# Patient Record
Sex: MALE | Race: BLACK OR AFRICAN AMERICAN | NOT HISPANIC OR LATINO | Employment: FULL TIME | URBAN - METROPOLITAN AREA
[De-identification: names, ages, dates, MRNs, and addresses within clinical notes are randomized per-mention and may not be internally consistent; named-entity substitution may affect disease eponyms.]

---

## 2017-07-03 ENCOUNTER — HOSPITAL ENCOUNTER (EMERGENCY)
Facility: OTHER | Age: 32
Discharge: HOME | End: 2017-07-03
Attending: EMERGENCY MEDICINE
Payer: COMMERCIAL

## 2017-07-03 VITALS
TEMPERATURE: 98 F | OXYGEN SATURATION: 99 % | SYSTOLIC BLOOD PRESSURE: 154 MMHG | BODY MASS INDEX: 22.4 KG/M2 | HEIGHT: 71 IN | HEART RATE: 50 BPM | DIASTOLIC BLOOD PRESSURE: 94 MMHG | WEIGHT: 160 LBS | RESPIRATION RATE: 16 BRPM

## 2017-07-03 DIAGNOSIS — K29.00 ACUTE GASTRITIS WITHOUT HEMORRHAGE, UNSPECIFIED GASTRITIS TYPE: ICD-10-CM

## 2017-07-03 DIAGNOSIS — R10.13 EPIGASTRIC ABDOMINAL PAIN: Primary | ICD-10-CM

## 2017-07-03 DIAGNOSIS — R10.9 ABDOMINAL PAIN: ICD-10-CM

## 2017-07-03 LAB
ALBUMIN SERPL BCP-MCNC: 4.5 G/DL
ALP SERPL-CCNC: 97 U/L
ALT SERPL W/O P-5'-P-CCNC: 19 U/L
ANION GAP SERPL CALC-SCNC: 12 MMOL/L
AST SERPL-CCNC: 29 U/L
BASOPHILS # BLD AUTO: 0.02 K/UL
BASOPHILS NFR BLD: 0.4 %
BILIRUB SERPL-MCNC: 0.2 MG/DL
BILIRUB UR QL STRIP: NEGATIVE
BUN SERPL-MCNC: 15 MG/DL
CALCIUM SERPL-MCNC: 10 MG/DL
CHLORIDE SERPL-SCNC: 105 MMOL/L
CLARITY UR: CLEAR
CO2 SERPL-SCNC: 22 MMOL/L
COLOR UR: YELLOW
CREAT SERPL-MCNC: 1 MG/DL
DIFFERENTIAL METHOD: ABNORMAL
EOSINOPHIL # BLD AUTO: 0.1 K/UL
EOSINOPHIL NFR BLD: 2 %
ERYTHROCYTE [DISTWIDTH] IN BLOOD BY AUTOMATED COUNT: 12.7 %
EST. GFR  (AFRICAN AMERICAN): >60 ML/MIN/1.73 M^2
EST. GFR  (NON AFRICAN AMERICAN): >60 ML/MIN/1.73 M^2
GLUCOSE SERPL-MCNC: 88 MG/DL
GLUCOSE UR QL STRIP: NEGATIVE
HCT VFR BLD AUTO: 41.2 %
HGB BLD-MCNC: 13.8 G/DL
HGB UR QL STRIP: NEGATIVE
KETONES UR QL STRIP: NEGATIVE
LEUKOCYTE ESTERASE UR QL STRIP: NEGATIVE
LIPASE SERPL-CCNC: 33 U/L
LYMPHOCYTES # BLD AUTO: 2 K/UL
LYMPHOCYTES NFR BLD: 38.8 %
MCH RBC QN AUTO: 29.3 PG
MCHC RBC AUTO-ENTMCNC: 33.5 %
MCV RBC AUTO: 88 FL
MONOCYTES # BLD AUTO: 0.2 K/UL
MONOCYTES NFR BLD: 4.5 %
NEUTROPHILS # BLD AUTO: 2.7 K/UL
NEUTROPHILS NFR BLD: 53.7 %
NITRITE UR QL STRIP: NEGATIVE
PH UR STRIP: 6 [PH] (ref 5–8)
PLATELET # BLD AUTO: 179 K/UL
PMV BLD AUTO: 12 FL
POTASSIUM SERPL-SCNC: 4.2 MMOL/L
PROT SERPL-MCNC: 8.5 G/DL
PROT UR QL STRIP: NEGATIVE
RBC # BLD AUTO: 4.71 M/UL
SODIUM SERPL-SCNC: 139 MMOL/L
SP GR UR STRIP: <=1.005 (ref 1–1.03)
URN SPEC COLLECT METH UR: ABNORMAL
UROBILINOGEN UR STRIP-ACNC: NEGATIVE EU/DL
WBC # BLD AUTO: 5.08 K/UL

## 2017-07-03 PROCEDURE — 99284 EMERGENCY DEPT VISIT MOD MDM: CPT | Mod: 25

## 2017-07-03 PROCEDURE — 81003 URINALYSIS AUTO W/O SCOPE: CPT

## 2017-07-03 PROCEDURE — 83690 ASSAY OF LIPASE: CPT

## 2017-07-03 PROCEDURE — 96360 HYDRATION IV INFUSION INIT: CPT

## 2017-07-03 PROCEDURE — 25000003 PHARM REV CODE 250: Performed by: EMERGENCY MEDICINE

## 2017-07-03 PROCEDURE — 80053 COMPREHEN METABOLIC PANEL: CPT

## 2017-07-03 PROCEDURE — 85025 COMPLETE CBC W/AUTO DIFF WBC: CPT

## 2017-07-03 RX ORDER — OMEPRAZOLE 20 MG/1
40 CAPSULE, DELAYED RELEASE ORAL DAILY
Qty: 30 CAPSULE | Refills: 0 | Status: SHIPPED | OUTPATIENT
Start: 2017-07-03 | End: 2019-06-11

## 2017-07-03 RX ADMIN — SODIUM CHLORIDE 1000 ML: 0.9 INJECTION, SOLUTION INTRAVENOUS at 10:07

## 2017-07-03 NOTE — ED TRIAGE NOTES
"Pt reports about 3-4 years ago having a "pancreas problems"; reports epigastric pain that started about 2-3 days ago; some intermittent diarrhea with some small tinged amount of blood noted in stool but denies any N/V;   "

## 2017-07-03 NOTE — ED PROVIDER NOTES
Encounter Date: 7/3/2017    SCRIBE #1 NOTE: I, Cynthia Galaviz, am scribing for, and in the presence of,  Dr. Cornell. I have scribed the entire note.       History     Chief Complaint   Patient presents with    Abdominal Pain     LUQ, hx of pancreatitis, admites to drinking last night     Time seen by provider: 9:28 AM    This is a 32 y.o. male who presents with complaint of upper abdominal pain. He reports onset of symptoms was a few hours ago. The patient states he woke with the pain. He describes the pain as sharp with a rating of 4/10. The patient denies any associated nausea, vomiting, diarrhea, urinary symptoms, fever or chills but admits to constipation and mild blood in stool. He states his stools have been hard for a few days. The patient also notes he has a hx of pancreatitis. He states he may be having a flare he drank more alcohol last night.       The history is provided by the patient.     Review of patient's allergies indicates:  No Known Allergies  Past Medical History:   Diagnosis Date    Pancreatitis      History reviewed. No pertinent surgical history.  History reviewed. No pertinent family history.  Social History   Substance Use Topics    Smoking status: Never Smoker    Smokeless tobacco: Never Used    Alcohol use Yes      Comment: occasionally     Review of Systems   Constitutional: Negative for chills and fever.   HENT: Negative for congestion and sore throat.    Eyes: Negative for redness and visual disturbance.   Respiratory: Negative for cough and shortness of breath.    Cardiovascular: Negative for chest pain and palpitations.   Gastrointestinal: Positive for abdominal pain. Negative for diarrhea, nausea and vomiting.   Genitourinary: Negative for dysuria.   Musculoskeletal: Negative for back pain.   Skin: Negative for rash.   Neurological: Negative for weakness and headaches.   Psychiatric/Behavioral: Negative for confusion.       Physical Exam     Initial Vitals [07/03/17 0840]    BP Pulse Resp Temp SpO2   116/67 63 16 97.5 °F (36.4 °C) 99 %      MAP       83.33         Physical Exam    Nursing note and vitals reviewed.  Constitutional: He appears well-developed and well-nourished. He is not diaphoretic. No distress.   HENT:   Head: Normocephalic and atraumatic.   Right Ear: External ear normal.   Left Ear: External ear normal.   Oropharynx is clear and intact. Moist mucus membranes   Eyes: EOM are normal.   Conjunctivae clear, pink, and intact.   Neck: Normal range of motion. Neck supple.   Cardiovascular: Normal rate and regular rhythm. Exam reveals no gallop and no friction rub.    No murmur heard.  Normal S1/S2.   Pulmonary/Chest: He has no wheezes. He has no rhonchi. He has no rales.   Clear to auscultation bilaterally.    Abdominal: Soft. Bowel sounds are normal. There is tenderness. There is no rebound and no guarding.   Mid epigastric tenderness.    Musculoskeletal: Normal range of motion. He exhibits no edema or tenderness.   No LE pitting edema.    Lymphadenopathy:     He has no cervical adenopathy.   Neurological: He is alert and oriented to person, place, and time. He has normal strength.   Skin: Skin is warm and dry. No rash noted.   No skin tenting. No lesions.         ED Course   Procedures  Labs Reviewed   URINALYSIS   CBC W/ AUTO DIFFERENTIAL   COMPREHENSIVE METABOLIC PANEL   LIPASE        Imaging Results          X-Ray Abdomen Flat And Erect (Final result)  Result time 07/03/17 09:52:33    Final result by Leandra Marcum MD (07/03/17 09:52:33)                 Impression:     No significant abnormality demonstrated.      Electronically signed by: Leandra Marcum MD  Date:     07/03/17  Time:    09:52              Narrative:    2 views obtained.  There is no free air.  The bowel gas pattern demonstrates no significant abnormality.  There are a few vascular calcifications within the left pelvis.  The osseous structures are intact.                              X-Rays:    Independently Interpreted Readings:   Abdomen: Flat and Erect Abdominal X-ray Reading (10:27 AM): No acute findings visualized. No infiltrate. No effusion.      Medical Decision Making:   Independently Interpreted Test(s):   I have ordered and independently interpreted X-rays - see prior notes.  Clinical Tests:   Lab Tests: Ordered and Reviewed  Radiological Study: Ordered and Reviewed            Scribe Attestation:   Scribe #1: I performed the above scribed service and the documentation accurately describes the services I performed. I attest to the accuracy of the note.    Attending Attestation:           Physician Attestation for Scribe:  Physician Attestation Statement for Scribe #1: I, Dr. Harris, reviewed documentation, as scribed by Cynthia Galaviz in my presence, and it is both accurate and complete.         Attending ED Notes:   Emergent evaluation a 32-year-old male with intermittent midepigastric abdominal pain after drinking alcohol over the past 3 days.  Patient is afebrile, nontoxic-appearing with stable vital signs.  No right upper quadrant tenderness to palpation.  No rebound or guarding.  Negative Kent sign.  No tenderness over the right lower quadrant the abdomen.  Patient has no elevation of white blood cell count.  H&H 13.8 and 41.2.  No acute findings on CMP except for total protein of 8.5.  Lipase is 33.  No acute findings and urinary analysis.  No acute findings on x-ray.  The patient is extensively counseled on his diagnosis and treatment including all diagnostic, laboratory and physical exam findings.  The patient is discharged in good condition and directed to follow-up with his primary care physician and gastroenterology in the next 24-48 hours.          ED Course     Clinical Impression:   No diagnosis found.                           Ever Harris MD  07/03/17 5161

## 2018-08-30 ENCOUNTER — OFFICE VISIT (OUTPATIENT)
Dept: URGENT CARE | Facility: CLINIC | Age: 33
End: 2018-08-30
Payer: COMMERCIAL

## 2018-08-30 VITALS
DIASTOLIC BLOOD PRESSURE: 77 MMHG | SYSTOLIC BLOOD PRESSURE: 140 MMHG | WEIGHT: 160 LBS | HEIGHT: 71 IN | OXYGEN SATURATION: 100 % | RESPIRATION RATE: 18 BRPM | BODY MASS INDEX: 22.4 KG/M2 | HEART RATE: 66 BPM | TEMPERATURE: 98 F

## 2018-08-30 DIAGNOSIS — J01.00 ACUTE NON-RECURRENT MAXILLARY SINUSITIS: Primary | ICD-10-CM

## 2018-08-30 DIAGNOSIS — J02.9 PHARYNGITIS, UNSPECIFIED ETIOLOGY: ICD-10-CM

## 2018-08-30 DIAGNOSIS — H65.93 OTITIS MEDIA WITH EFFUSION, BILATERAL: ICD-10-CM

## 2018-08-30 PROCEDURE — 99203 OFFICE O/P NEW LOW 30 MIN: CPT | Mod: 25,S$GLB,, | Performed by: PHYSICIAN ASSISTANT

## 2018-08-30 PROCEDURE — 96372 THER/PROPH/DIAG INJ SC/IM: CPT | Mod: S$GLB,,, | Performed by: PHYSICIAN ASSISTANT

## 2018-08-30 PROCEDURE — 3008F BODY MASS INDEX DOCD: CPT | Mod: CPTII,S$GLB,, | Performed by: PHYSICIAN ASSISTANT

## 2018-08-30 RX ORDER — AMOXICILLIN 875 MG/1
875 TABLET, FILM COATED ORAL 2 TIMES DAILY
Qty: 20 TABLET | Refills: 0 | Status: SHIPPED | OUTPATIENT
Start: 2018-08-30 | End: 2018-09-09

## 2018-08-30 RX ORDER — DEXAMETHASONE SODIUM PHOSPHATE 100 MG/10ML
6 INJECTION INTRAMUSCULAR; INTRAVENOUS ONCE
Status: COMPLETED | OUTPATIENT
Start: 2018-08-30 | End: 2018-08-30

## 2018-08-30 RX ADMIN — DEXAMETHASONE SODIUM PHOSPHATE 6 MG: 100 INJECTION INTRAMUSCULAR; INTRAVENOUS at 08:08

## 2018-08-30 NOTE — PATIENT INSTRUCTIONS
Sinusitis (Antibiotic Treatment)    The sinuses are air-filled spaces within the bones of the face. They connect to the inside of the nose. Sinusitis is an inflammation of the tissue lining the sinus cavity. Sinus inflammation can occur during a cold. It can also be due to allergies to pollens and other particles in the air. Sinusitis can cause symptoms of sinus congestion and fullness. A sinus infection causes fever, headache and facial pain. There is often green or yellow drainage from the nose or into the back of the throat (post-nasal drip). You have been given antibiotics to treat this condition.  Home care:  · Take the full course of antibiotics as instructed. Do not stop taking them, even if you feel better.  · Drink plenty of water, hot tea, and other liquids. This may help thin mucus. It also may promote sinus drainage.  · Heat may help soothe painful areas of the face. Use a towel soaked in hot water. Or,  the shower and direct the hot spray onto your face. Using a vaporizer along with a menthol rub at night may also help.   · An expectorant containing guaifenesin may help thin the mucus and promote drainage from the sinuses.  · Over-the-counter decongestants may be used unless a similar medicine was prescribed. Nasal sprays work the fastest. Use one that contains phenylephrine or oxymetazoline. First blow the nose gently. Then use the spray. Do not use these medicines more often than directed on the label or symptoms may get worse. You may also use tablets containing pseudoephedrine. Avoid products that combine ingredients, because side effects may be increased. Read labels. You can also ask the pharmacist for help. (NOTE: Persons with high blood pressure should not use decongestants. They can raise blood pressure.)  · Over-the-counter antihistamines may help if allergies contributed to your sinusitis.    · Do not use nasal rinses or irrigation during an acute sinus infection, unless told to by  your health care provider. Rinsing may spread the infection to other sinuses.  · Use acetaminophen or ibuprofen to control pain, unless another pain medicine was prescribed. (If you have chronic liver or kidney disease or ever had a stomach ulcer, talk with your doctor before using these medicines. Aspirin should never be used in anyone under 18 years of age who is ill with a fever. It may cause severe liver damage.)  · Don't smoke. This can worsen symptoms.  Follow-up care  Follow up with your healthcare provider or our staff if you are not improving within the next week.  When to seek medical advice  Call your healthcare provider if any of these occur:  · Facial pain or headache becoming more severe  · Stiff neck  · Unusual drowsiness or confusion  · Swelling of the forehead or eyelids  · Vision problems, including blurred or double vision  · Fever of 100.4ºF (38ºC) or higher, or as directed by your healthcare provider  · Seizure  · Breathing problems  · Symptoms not resolving within 10 days  Date Last Reviewed: 4/13/2015  © 7369-5446 [employer]. [address] [place] [address]. All rights reserved. This information is not intended as a substitute for professional medical care. Always follow your healthcare professional's instructions.      Please follow up with your Primary care provider within 2-5 days if your signs and symptoms have not resolved or worsen.     If your condition worsens or fails to improve we recommend that you receive another evaluation at the emergency room immediately or contact your primary medical clinic to discuss your concerns.   You must understand that you have received an Urgent Care treatment only and that you may be released before all of your medical problems are known or treated. You, the patient, will arrange for follow up care as instructed.     RED FLAGS/WARNING SYMPTOMS DISCUSSED WITH PATIENT THAT WOULD WARRANT EMERGENT MEDICAL ATTENTION. PATIENT  VERBALIZED UNDERSTANDING.

## 2018-08-30 NOTE — PROGRESS NOTES
"Subjective:       Patient ID: Ankur Dockery is a 33 y.o. male.    Vitals:  height is 5' 11" (1.803 m) and weight is 72.6 kg (160 lb). His temperature is 98.2 °F (36.8 °C). His blood pressure is 140/77 (abnormal) and his pulse is 66. His respiration is 18 and oxygen saturation is 100%.     Chief Complaint: No chief complaint on file.    Sinus Problem   This is a new problem. The current episode started 1 to 4 weeks ago. The problem has been gradually worsening since onset. There has been no fever. His pain is at a severity of 6/10. The pain is moderate. Associated symptoms include congestion, coughing, headaches, sinus pressure and a sore throat. Pertinent negatives include no chills, diaphoresis, ear pain, hoarse voice, neck pain, shortness of breath, sneezing or swollen glands. Past treatments include nothing. The treatment provided no relief.     Review of Systems   Constitution: Negative for chills, diaphoresis and fever.   HENT: Positive for congestion, sinus pressure and sore throat. Negative for ear pain, hoarse voice and sneezing.    Eyes: Negative for blurred vision.   Cardiovascular: Negative for chest pain.   Respiratory: Positive for cough. Negative for shortness of breath.    Skin: Negative for rash.   Musculoskeletal: Negative for back pain, joint pain and neck pain.   Gastrointestinal: Negative for abdominal pain, diarrhea, nausea and vomiting.   Neurological: Positive for headaches.   Psychiatric/Behavioral: The patient is not nervous/anxious.        Objective:      Physical Exam   Constitutional: He is oriented to person, place, and time. He appears well-developed and well-nourished. He is cooperative.  Non-toxic appearance. He does not appear ill. No distress.   HENT:   Head: Normocephalic and atraumatic.   Right Ear: Hearing, external ear and ear canal normal. A middle ear effusion is present.   Left Ear: Hearing, external ear and ear canal normal. A middle ear effusion is present.   Nose: Mucosal " edema, rhinorrhea and sinus tenderness present. No nasal deformity. No epistaxis. Right sinus exhibits maxillary sinus tenderness. Right sinus exhibits no frontal sinus tenderness. Left sinus exhibits maxillary sinus tenderness. Left sinus exhibits no frontal sinus tenderness.   Mouth/Throat: Uvula is midline and mucous membranes are normal. No trismus in the jaw. Normal dentition. No uvula swelling. Posterior oropharyngeal erythema present. Tonsils are 1+ on the right. Tonsils are 1+ on the left. No tonsillar exudate.   Eyes: Conjunctivae and lids are normal. No scleral icterus.   Sclera clear bilat   Neck: Trachea normal, full passive range of motion without pain and phonation normal. Neck supple.   Cardiovascular: Normal rate, regular rhythm, normal heart sounds, intact distal pulses and normal pulses.   Pulmonary/Chest: Effort normal and breath sounds normal. No respiratory distress.   Abdominal: Soft. Normal appearance and bowel sounds are normal. He exhibits no distension. There is no tenderness.   Musculoskeletal: Normal range of motion. He exhibits no edema or deformity.   Neurological: He is alert and oriented to person, place, and time. He exhibits normal muscle tone. Coordination normal.   Skin: Skin is warm, dry and intact. He is not diaphoretic. No pallor.   Psychiatric: He has a normal mood and affect. His speech is normal and behavior is normal. Judgment and thought content normal. Cognition and memory are normal.   Nursing note and vitals reviewed.      Assessment:       1. Acute non-recurrent maxillary sinusitis    2. Pharyngitis, unspecified etiology    3. Otitis media with effusion, bilateral        Plan:         Acute non-recurrent maxillary sinusitis  -     amoxicillin (AMOXIL) 875 MG tablet; Take 1 tablet (875 mg total) by mouth 2 (two) times daily. for 10 days  Dispense: 20 tablet; Refill: 0    Pharyngitis, unspecified etiology    Otitis media with effusion, bilateral    Other orders  -      dexamethasone injection 6 mg; Inject 0.6 mLs (6 mg total) into the muscle once.          Sinusitis (Antibiotic Treatment)    The sinuses are air-filled spaces within the bones of the face. They connect to the inside of the nose. Sinusitis is an inflammation of the tissue lining the sinus cavity. Sinus inflammation can occur during a cold. It can also be due to allergies to pollens and other particles in the air. Sinusitis can cause symptoms of sinus congestion and fullness. A sinus infection causes fever, headache and facial pain. There is often green or yellow drainage from the nose or into the back of the throat (post-nasal drip). You have been given antibiotics to treat this condition.  Home care:  · Take the full course of antibiotics as instructed. Do not stop taking them, even if you feel better.  · Drink plenty of water, hot tea, and other liquids. This may help thin mucus. It also may promote sinus drainage.  · Heat may help soothe painful areas of the face. Use a towel soaked in hot water. Or,  the shower and direct the hot spray onto your face. Using a vaporizer along with a menthol rub at night may also help.   · An expectorant containing guaifenesin may help thin the mucus and promote drainage from the sinuses.  · Over-the-counter decongestants may be used unless a similar medicine was prescribed. Nasal sprays work the fastest. Use one that contains phenylephrine or oxymetazoline. First blow the nose gently. Then use the spray. Do not use these medicines more often than directed on the label or symptoms may get worse. You may also use tablets containing pseudoephedrine. Avoid products that combine ingredients, because side effects may be increased. Read labels. You can also ask the pharmacist for help. (NOTE: Persons with high blood pressure should not use decongestants. They can raise blood pressure.)  · Over-the-counter antihistamines may help if allergies contributed to your sinusitis.    · Do  not use nasal rinses or irrigation during an acute sinus infection, unless told to by your health care provider. Rinsing may spread the infection to other sinuses.  · Use acetaminophen or ibuprofen to control pain, unless another pain medicine was prescribed. (If you have chronic liver or kidney disease or ever had a stomach ulcer, talk with your doctor before using these medicines. Aspirin should never be used in anyone under 18 years of age who is ill with a fever. It may cause severe liver damage.)  · Don't smoke. This can worsen symptoms.  Follow-up care  Follow up with your healthcare provider or our staff if you are not improving within the next week.  When to seek medical advice  Call your healthcare provider if any of these occur:  · Facial pain or headache becoming more severe  · Stiff neck  · Unusual drowsiness or confusion  · Swelling of the forehead or eyelids  · Vision problems, including blurred or double vision  · Fever of 100.4ºF (38ºC) or higher, or as directed by your healthcare provider  · Seizure  · Breathing problems  · Symptoms not resolving within 10 days  Date Last Reviewed: 4/13/2015  © 2719-0802 [employer]. [address] [place] [address]. All rights reserved. This information is not intended as a substitute for professional medical care. Always follow your healthcare professional's instructions.      Please follow up with your Primary care provider within 2-5 days if your signs and symptoms have not resolved or worsen.     If your condition worsens or fails to improve we recommend that you receive another evaluation at the emergency room immediately or contact your primary medical clinic to discuss your concerns.   You must understand that you have received an Urgent Care treatment only and that you may be released before all of your medical problems are known or treated. You, the patient, will arrange for follow up care as instructed.     RED FLAGS/WARNING  SYMPTOMS DISCUSSED WITH PATIENT THAT WOULD WARRANT EMERGENT MEDICAL ATTENTION. PATIENT VERBALIZED UNDERSTANDING.

## 2018-08-30 NOTE — LETTER
August 30, 2018      [facility] [address] [address]  Phone: [phone]  Fax: [phone]       Patient: Ankur Dockery   YOB: 1985  Date of Visit: 08/30/2018    To Whom It May Concern:    HARSHAD Dockery  was at [facility] on 08/30/2018. He may return to work/school on 8/31/18 with no restrictions. If you have any questions or concerns, or if I can be of further assistance, please do not hesitate to contact me.    Sincerely,    Carmen Pearce PA-C

## 2021-03-27 ENCOUNTER — IMMUNIZATION (OUTPATIENT)
Dept: PRIMARY CARE CLINIC | Facility: CLINIC | Age: 36
End: 2021-03-27
Payer: OTHER GOVERNMENT

## 2021-03-27 DIAGNOSIS — Z23 NEED FOR VACCINATION: Primary | ICD-10-CM

## 2021-03-27 PROCEDURE — 91300 COVID-19, MRNA, LNP-S, PF, 30 MCG/0.3 ML DOSE VACCINE: CPT | Mod: PBBFAC | Performed by: EMERGENCY MEDICINE

## 2021-04-17 ENCOUNTER — IMMUNIZATION (OUTPATIENT)
Dept: PRIMARY CARE CLINIC | Facility: CLINIC | Age: 36
End: 2021-04-17

## 2021-04-17 DIAGNOSIS — Z23 NEED FOR VACCINATION: Primary | ICD-10-CM

## 2021-04-17 PROCEDURE — 91300 COVID-19, MRNA, LNP-S, PF, 30 MCG/0.3 ML DOSE VACCINE: ICD-10-PCS | Mod: S$GLB,,, | Performed by: FAMILY MEDICINE

## 2021-04-17 PROCEDURE — 0002A COVID-19, MRNA, LNP-S, PF, 30 MCG/0.3 ML DOSE VACCINE: CPT | Mod: CV19,S$GLB,, | Performed by: FAMILY MEDICINE

## 2021-04-17 PROCEDURE — 91300 COVID-19, MRNA, LNP-S, PF, 30 MCG/0.3 ML DOSE VACCINE: CPT | Mod: S$GLB,,, | Performed by: FAMILY MEDICINE

## 2021-04-17 PROCEDURE — 0002A COVID-19, MRNA, LNP-S, PF, 30 MCG/0.3 ML DOSE VACCINE: ICD-10-PCS | Mod: CV19,S$GLB,, | Performed by: FAMILY MEDICINE

## 2021-07-01 ENCOUNTER — PATIENT MESSAGE (OUTPATIENT)
Dept: ADMINISTRATIVE | Facility: OTHER | Age: 36
End: 2021-07-01

## 2022-01-21 ENCOUNTER — IMMUNIZATION (OUTPATIENT)
Dept: PRIMARY CARE CLINIC | Facility: CLINIC | Age: 37
End: 2022-01-21
Payer: COMMERCIAL

## 2022-01-21 DIAGNOSIS — Z23 NEED FOR VACCINATION: Primary | ICD-10-CM

## 2022-01-21 PROCEDURE — 91300 COVID-19, MRNA, LNP-S, PF, 30 MCG/0.3 ML DOSE VACCINE: CPT | Mod: PBBFAC,PN

## 2022-01-21 PROCEDURE — 0001A COVID-19, MRNA, LNP-S, PF, 30 MCG/0.3 ML DOSE VACCINE: CPT | Mod: PBBFAC,PN

## 2022-02-25 ENCOUNTER — OFFICE VISIT (OUTPATIENT)
Dept: INTERNAL MEDICINE | Facility: CLINIC | Age: 37
End: 2022-02-25
Payer: COMMERCIAL

## 2022-02-25 VITALS
HEART RATE: 86 BPM | HEIGHT: 68 IN | WEIGHT: 158.94 LBS | BODY MASS INDEX: 24.09 KG/M2 | OXYGEN SATURATION: 98 % | SYSTOLIC BLOOD PRESSURE: 110 MMHG | DIASTOLIC BLOOD PRESSURE: 72 MMHG

## 2022-02-25 DIAGNOSIS — Z87.19 HISTORY OF ACUTE PANCREATITIS: ICD-10-CM

## 2022-02-25 DIAGNOSIS — Z00.00 LABORATORY EXAMINATION ORDERED AS PART OF A ROUTINE GENERAL MEDICAL EXAMINATION: ICD-10-CM

## 2022-02-25 DIAGNOSIS — F10.20 UNCOMPLICATED ALCOHOL DEPENDENCE: ICD-10-CM

## 2022-02-25 DIAGNOSIS — Z76.89 ESTABLISHING CARE WITH NEW DOCTOR, ENCOUNTER FOR: Primary | ICD-10-CM

## 2022-02-25 DIAGNOSIS — Z00.00 ANNUAL PHYSICAL EXAM: ICD-10-CM

## 2022-02-25 PROCEDURE — 99999 PR PBB SHADOW E&M-EST. PATIENT-LVL IV: ICD-10-PCS | Mod: PBBFAC,,, | Performed by: INTERNAL MEDICINE

## 2022-02-25 PROCEDURE — 99203 PR OFFICE/OUTPT VISIT, NEW, LEVL III, 30-44 MIN: ICD-10-PCS | Mod: S$GLB,,, | Performed by: INTERNAL MEDICINE

## 2022-02-25 PROCEDURE — 99203 OFFICE O/P NEW LOW 30 MIN: CPT | Mod: S$GLB,,, | Performed by: INTERNAL MEDICINE

## 2022-02-25 PROCEDURE — 1159F PR MEDICATION LIST DOCUMENTED IN MEDICAL RECORD: ICD-10-PCS | Mod: CPTII,S$GLB,, | Performed by: INTERNAL MEDICINE

## 2022-02-25 PROCEDURE — 3074F SYST BP LT 130 MM HG: CPT | Mod: CPTII,S$GLB,, | Performed by: INTERNAL MEDICINE

## 2022-02-25 PROCEDURE — 1160F PR REVIEW ALL MEDS BY PRESCRIBER/CLIN PHARMACIST DOCUMENTED: ICD-10-PCS | Mod: CPTII,S$GLB,, | Performed by: INTERNAL MEDICINE

## 2022-02-25 PROCEDURE — 1160F RVW MEDS BY RX/DR IN RCRD: CPT | Mod: CPTII,S$GLB,, | Performed by: INTERNAL MEDICINE

## 2022-02-25 PROCEDURE — 1159F MED LIST DOCD IN RCRD: CPT | Mod: CPTII,S$GLB,, | Performed by: INTERNAL MEDICINE

## 2022-02-25 PROCEDURE — 3074F PR MOST RECENT SYSTOLIC BLOOD PRESSURE < 130 MM HG: ICD-10-PCS | Mod: CPTII,S$GLB,, | Performed by: INTERNAL MEDICINE

## 2022-02-25 PROCEDURE — 3044F PR MOST RECENT HEMOGLOBIN A1C LEVEL <7.0%: ICD-10-PCS | Mod: CPTII,S$GLB,, | Performed by: INTERNAL MEDICINE

## 2022-02-25 PROCEDURE — 3044F HG A1C LEVEL LT 7.0%: CPT | Mod: CPTII,S$GLB,, | Performed by: INTERNAL MEDICINE

## 2022-02-25 PROCEDURE — 99999 PR PBB SHADOW E&M-EST. PATIENT-LVL IV: CPT | Mod: PBBFAC,,, | Performed by: INTERNAL MEDICINE

## 2022-02-25 PROCEDURE — 3078F PR MOST RECENT DIASTOLIC BLOOD PRESSURE < 80 MM HG: ICD-10-PCS | Mod: CPTII,S$GLB,, | Performed by: INTERNAL MEDICINE

## 2022-02-25 PROCEDURE — 3008F BODY MASS INDEX DOCD: CPT | Mod: CPTII,S$GLB,, | Performed by: INTERNAL MEDICINE

## 2022-02-25 PROCEDURE — 3078F DIAST BP <80 MM HG: CPT | Mod: CPTII,S$GLB,, | Performed by: INTERNAL MEDICINE

## 2022-02-25 PROCEDURE — 3008F PR BODY MASS INDEX (BMI) DOCUMENTED: ICD-10-PCS | Mod: CPTII,S$GLB,, | Performed by: INTERNAL MEDICINE

## 2022-02-25 NOTE — PROGRESS NOTES
Subjective:       Patient ID: Ankur Dockery is a 36 y.o. male.    Chief Complaint: Annual Exam      Ankur Dockery is a 36 y.o. year old male    HPI   36 y.o. presents for establishment of care at behest of his wife; has history of acute pancreatitis 2013 (suspected EtOH pancreatitis). Today he is at baseline health with no complaints. Due for routine labwork and age appropriate health screenings    Patient is  with 4 kids, ages 6, 9, 13,14.   Works for [employer] as an     Denies smoking, use of current drugs. Does occasional use hookah.   Drinks heavily, has tremors, has nausea (+) withdrawal symptoms.    Has been working towards eliminating alcohol from home. Only drinks outside of the house. Sometimes will drink in mornings. Does feel guilty. Does feel like it is effecting his life. Would like to start rehab.     Review of Systems   Constitutional: Negative for activity change, appetite change, chills, fatigue, fever and unexpected weight change.   HENT: Negative for congestion, rhinorrhea and sore throat.    Eyes: Negative for visual disturbance.   Respiratory: Negative for cough and shortness of breath.    Cardiovascular: Negative for chest pain and palpitations.   Gastrointestinal: Positive for abdominal pain (not since 2/15/2022). Negative for diarrhea, nausea and vomiting.   Genitourinary: Negative for difficulty urinating, dysuria and hematuria.   Musculoskeletal: Positive for arthralgias. Negative for back pain and myalgias.   Skin: Negative for color change and rash.        Dry skin on between fingers / cracking at times   Neurological: Negative for dizziness, weakness and headaches.         Past Medical History:   Diagnosis Date    Pancreatitis         Prior to Admission medications    Medication Sig Start Date End Date Taking? Authorizing Provider   flu vacc [id number] 6mos up,PF, (FLUARIX QUAD [id number], PF,) 60 mcg (15 mcg x 4)/0.5 mL Syrg uad 1/26/22   Keith  "Kita Germain   ondansetron (ZOFRAN) 4 MG tablet Take 1 tablet (4 mg total) by mouth every 6 (six) hours. 2/16/22   Ruby Morales NP        Past medical history, surgical history, and family medical history reviewed and updated as appropriate.    Medications and allergies reviewed.     Objective:          Vitals:    02/25/22 1358   BP: 110/72   BP Location: Left arm   Patient Position: Sitting   BP Method: Medium (Manual)   Pulse: 86   SpO2: 98%   Weight: 72.1 kg (158 lb 15.2 oz)   Height: 5' 8" (1.727 m)     Body mass index is 24.17 kg/m².  Physical Exam  Constitutional:       General: He is not in acute distress.     Appearance: He is well-developed.   HENT:      Head: Normocephalic and atraumatic.      Nose: Nose normal.   Eyes:      General: No scleral icterus.     Extraocular Movements: Extraocular movements intact.   Neck:      Thyroid: No thyromegaly.      Vascular: No JVD.      Trachea: No tracheal deviation.   Cardiovascular:      Rate and Rhythm: Normal rate and regular rhythm.      Heart sounds: Normal heart sounds. No murmur heard.    No friction rub. No gallop.   Pulmonary:      Effort: Pulmonary effort is normal. No respiratory distress.      Breath sounds: Normal breath sounds. No wheezing or rales.   Abdominal:      General: Bowel sounds are normal. There is no distension.      Palpations: Abdomen is soft. There is no mass.      Tenderness: There is no abdominal tenderness.   Musculoskeletal:         General: No tenderness. Normal range of motion.      Cervical back: Normal range of motion and neck supple.   Lymphadenopathy:      Cervical: No cervical adenopathy.   Skin:     General: Skin is warm and dry.      Capillary Refill: Capillary refill takes less than 2 seconds.      Findings: No rash.   Neurological:      Mental Status: He is alert and oriented to person, place, and time.      Cranial Nerves: No cranial nerve deficit.      Deep Tendon Reflexes: Reflexes normal.   Psychiatric:         " Behavior: Behavior normal.         Lab Results   Component Value Date    WBC 6.76 02/16/2022    HGB 14.6 02/16/2022    HCT 44.8 02/16/2022     02/16/2022    ALT 45 (H) 02/16/2022    AST 69 (H) 02/16/2022     02/16/2022    K 3.9 02/16/2022     02/16/2022    CREATININE 1.1 02/16/2022    BUN 12 02/16/2022    CO2 24 02/16/2022       Assessment:       1. Establishing care with new doctor, encounter for    2. Annual physical exam    3. Laboratory examination ordered as part of a routine general medical examination    4. Uncomplicated alcohol dependence    5. History of acute pancreatitis          Plan:     Ankur was seen today for annual exam.    Diagnoses and all orders for this visit:    Establishing care with new doctor, encounter for  Comments:  benign examination, recently seen in ER for L flank pain day after superbowl. Had few alcoholic varun day of super bowl, pain radiated to L groin    Annual physical exam    Laboratory examination ordered as part of a routine general medical examination  Comments:  will obtain routine labwork and age appropriate health screenings    History of acute pancreatitis  Comments:  initial diagnosis 2013; likely alcoholic pancreatitis, had ICU admission after restarting diet, admitted for 8 days.    Uncomplicated alcohol dependence  Comments:  refer to addiction psychiatry, phone number given to outpatient intensive rehab.        Health maintenance reviewed with patient.     Follow up in about 2 months (around 4/25/2022).    Casey Mcintyre MD  Internal Medicine / Primary Care  [facility]  2/25/2022

## 2022-02-25 NOTE — PATIENT INSTRUCTIONS
A referral for [facility] has been placed for you. The psychiatry department wants you to call to schedule your own appointment, it cannot be scheduled for you through check-out. Please call [phone] to schedule this appointment.     Schedule fasting bloodwork    Tetanus shot today    Resources to rehabilitation facilities and AA available.    Return to clinic in 2 months or sooner if needed.

## 2022-02-28 ENCOUNTER — LAB VISIT (OUTPATIENT)
Dept: LAB | Facility: HOSPITAL | Age: 37
End: 2022-02-28
Payer: COMMERCIAL

## 2022-02-28 DIAGNOSIS — Z00.00 LABORATORY EXAMINATION ORDERED AS PART OF A ROUTINE GENERAL MEDICAL EXAMINATION: ICD-10-CM

## 2022-02-28 LAB
ALBUMIN SERPL BCP-MCNC: 4.2 G/DL (ref 3.5–5.2)
ALP SERPL-CCNC: 75 U/L (ref 55–135)
ALT SERPL W/O P-5'-P-CCNC: 26 U/L (ref 10–44)
ANION GAP SERPL CALC-SCNC: 10 MMOL/L (ref 8–16)
AST SERPL-CCNC: 32 U/L (ref 10–40)
BASOPHILS # BLD AUTO: 0.02 K/UL (ref 0–0.2)
BASOPHILS NFR BLD: 0.4 % (ref 0–1.9)
BILIRUB SERPL-MCNC: 0.5 MG/DL (ref 0.1–1)
BUN SERPL-MCNC: 10 MG/DL (ref 6–20)
CALCIUM SERPL-MCNC: 10.2 MG/DL (ref 8.7–10.5)
CHLORIDE SERPL-SCNC: 99 MMOL/L (ref 95–110)
CHOLEST SERPL-MCNC: 251 MG/DL (ref 120–199)
CHOLEST/HDLC SERPL: 4.1 {RATIO} (ref 2–5)
CO2 SERPL-SCNC: 28 MMOL/L (ref 23–29)
CREAT SERPL-MCNC: 1 MG/DL (ref 0.5–1.4)
DIFFERENTIAL METHOD: ABNORMAL
EOSINOPHIL # BLD AUTO: 0.1 K/UL (ref 0–0.5)
EOSINOPHIL NFR BLD: 1.3 % (ref 0–8)
ERYTHROCYTE [DISTWIDTH] IN BLOOD BY AUTOMATED COUNT: 12.3 % (ref 11.5–14.5)
EST. GFR  (AFRICAN AMERICAN): >60 ML/MIN/1.73 M^2
EST. GFR  (NON AFRICAN AMERICAN): >60 ML/MIN/1.73 M^2
ESTIMATED AVG GLUCOSE: 103 MG/DL (ref 68–131)
GLUCOSE SERPL-MCNC: 98 MG/DL (ref 70–110)
HBA1C MFR BLD: 5.2 % (ref 4–5.6)
HCT VFR BLD AUTO: 43.3 % (ref 40–54)
HDLC SERPL-MCNC: 61 MG/DL (ref 40–75)
HDLC SERPL: 24.3 % (ref 20–50)
HGB BLD-MCNC: 14 G/DL (ref 14–18)
HIV 1+2 AB+HIV1 P24 AG SERPL QL IA: NEGATIVE
IMM GRANULOCYTES # BLD AUTO: 0.03 K/UL (ref 0–0.04)
IMM GRANULOCYTES NFR BLD AUTO: 0.6 % (ref 0–0.5)
LDLC SERPL CALC-MCNC: 151 MG/DL (ref 63–159)
LYMPHOCYTES # BLD AUTO: 1.7 K/UL (ref 1–4.8)
LYMPHOCYTES NFR BLD: 32.4 % (ref 18–48)
MCH RBC QN AUTO: 30.2 PG (ref 27–31)
MCHC RBC AUTO-ENTMCNC: 32.3 G/DL (ref 32–36)
MCV RBC AUTO: 94 FL (ref 82–98)
MONOCYTES # BLD AUTO: 0.6 K/UL (ref 0.3–1)
MONOCYTES NFR BLD: 11.1 % (ref 4–15)
NEUTROPHILS # BLD AUTO: 2.8 K/UL (ref 1.8–7.7)
NEUTROPHILS NFR BLD: 54.2 % (ref 38–73)
NONHDLC SERPL-MCNC: 190 MG/DL
NRBC BLD-RTO: 0 /100 WBC
PLATELET # BLD AUTO: 225 K/UL (ref 150–450)
PMV BLD AUTO: 12.8 FL (ref 9.2–12.9)
POTASSIUM SERPL-SCNC: 4.3 MMOL/L (ref 3.5–5.1)
PROT SERPL-MCNC: 8.3 G/DL (ref 6–8.4)
RBC # BLD AUTO: 4.63 M/UL (ref 4.6–6.2)
SODIUM SERPL-SCNC: 137 MMOL/L (ref 136–145)
TRIGL SERPL-MCNC: 195 MG/DL (ref 30–150)
TSH SERPL DL<=0.005 MIU/L-ACNC: 0.56 UIU/ML (ref 0.4–4)
WBC # BLD AUTO: 5.22 K/UL (ref 3.9–12.7)

## 2022-02-28 PROCEDURE — 80061 LIPID PANEL: CPT | Performed by: INTERNAL MEDICINE

## 2022-02-28 PROCEDURE — 87389 HIV-1 AG W/HIV-1&-2 AB AG IA: CPT | Performed by: INTERNAL MEDICINE

## 2022-02-28 PROCEDURE — 80053 COMPREHEN METABOLIC PANEL: CPT | Performed by: INTERNAL MEDICINE

## 2022-02-28 PROCEDURE — 36415 COLL VENOUS BLD VENIPUNCTURE: CPT | Performed by: INTERNAL MEDICINE

## 2022-02-28 PROCEDURE — 86803 HEPATITIS C AB TEST: CPT | Performed by: INTERNAL MEDICINE

## 2022-02-28 PROCEDURE — 84443 ASSAY THYROID STIM HORMONE: CPT | Performed by: INTERNAL MEDICINE

## 2022-02-28 PROCEDURE — 83036 HEMOGLOBIN GLYCOSYLATED A1C: CPT | Performed by: INTERNAL MEDICINE

## 2022-02-28 PROCEDURE — 85025 COMPLETE CBC W/AUTO DIFF WBC: CPT | Performed by: INTERNAL MEDICINE

## 2022-03-02 LAB — HCV AB SERPL QL IA: NEGATIVE

## 2022-03-07 ENCOUNTER — TELEPHONE (OUTPATIENT)
Dept: INTERNAL MEDICINE | Facility: CLINIC | Age: 37
End: 2022-03-07
Payer: COMMERCIAL

## 2022-03-07 PROBLEM — F10.20 UNCOMPLICATED ALCOHOL DEPENDENCE: Status: ACTIVE | Noted: 2022-03-07

## 2022-03-07 PROBLEM — Z87.19 HISTORY OF ACUTE PANCREATITIS: Status: ACTIVE | Noted: 2022-03-07

## 2022-03-07 NOTE — TELEPHONE ENCOUNTER
----- Message from Deepti Chand MA sent at 3/2/2022  2:05 PM CST -----  High cholesterol, need to decrease dietary intake of fats and carbohydrates. Would encourage you to start an exercise regimen. The american heart association recommends 150 minutes per week (30 mins per day, 5 times a week).

## 2022-03-07 NOTE — TELEPHONE ENCOUNTER
Called patient to relay lab work results, message from Dr Mcintyre, no answer left voicemail message

## 2022-03-07 NOTE — TELEPHONE ENCOUNTER
----- Message from Maddy Dodson MA sent at 3/7/2022  1:41 PM CST -----  Contact: Self     ----- Message -----  From: Breanne Leon  Sent: 3/7/2022  11:13 AM CST  To: [facility] Staff    Patient is returning a phone call.  Who left a message for the patient: Meera Owen LPN  Does patient know what this is regarding:  results  Would you like a call back, or a response through your [facility] portal?:   call back  Comments:

## 2022-03-07 NOTE — TELEPHONE ENCOUNTER
Spoke with patient regarding labwork, patient states he saw lab results online and signed up for gym at [employer] today

## 2022-04-25 ENCOUNTER — TELEPHONE (OUTPATIENT)
Dept: INTERNAL MEDICINE | Facility: CLINIC | Age: 37
End: 2022-04-25

## 2022-04-25 ENCOUNTER — OFFICE VISIT (OUTPATIENT)
Dept: INTERNAL MEDICINE | Facility: CLINIC | Age: 37
End: 2022-04-25
Payer: COMMERCIAL

## 2022-04-25 VITALS
WEIGHT: 154.56 LBS | SYSTOLIC BLOOD PRESSURE: 138 MMHG | HEIGHT: 68 IN | HEART RATE: 69 BPM | OXYGEN SATURATION: 99 % | BODY MASS INDEX: 23.43 KG/M2 | DIASTOLIC BLOOD PRESSURE: 94 MMHG

## 2022-04-25 DIAGNOSIS — F10.20 UNCOMPLICATED ALCOHOL DEPENDENCE: ICD-10-CM

## 2022-04-25 DIAGNOSIS — R03.0 ELEVATED BLOOD PRESSURE READING: ICD-10-CM

## 2022-04-25 DIAGNOSIS — Z09 FOLLOW UP: Primary | ICD-10-CM

## 2022-04-25 DIAGNOSIS — Z87.19 HISTORY OF ACUTE PANCREATITIS: ICD-10-CM

## 2022-04-25 DIAGNOSIS — E78.5 DYSLIPIDEMIA: ICD-10-CM

## 2022-04-25 PROCEDURE — 99213 OFFICE O/P EST LOW 20 MIN: CPT | Mod: S$GLB,,, | Performed by: INTERNAL MEDICINE

## 2022-04-25 PROCEDURE — 3075F PR MOST RECENT SYSTOLIC BLOOD PRESS GE 130-139MM HG: ICD-10-PCS | Mod: CPTII,S$GLB,, | Performed by: INTERNAL MEDICINE

## 2022-04-25 PROCEDURE — 1160F RVW MEDS BY RX/DR IN RCRD: CPT | Mod: CPTII,S$GLB,, | Performed by: INTERNAL MEDICINE

## 2022-04-25 PROCEDURE — 3008F BODY MASS INDEX DOCD: CPT | Mod: CPTII,S$GLB,, | Performed by: INTERNAL MEDICINE

## 2022-04-25 PROCEDURE — 3080F PR MOST RECENT DIASTOLIC BLOOD PRESSURE >= 90 MM HG: ICD-10-PCS | Mod: CPTII,S$GLB,, | Performed by: INTERNAL MEDICINE

## 2022-04-25 PROCEDURE — 3080F DIAST BP >= 90 MM HG: CPT | Mod: CPTII,S$GLB,, | Performed by: INTERNAL MEDICINE

## 2022-04-25 PROCEDURE — 1159F PR MEDICATION LIST DOCUMENTED IN MEDICAL RECORD: ICD-10-PCS | Mod: CPTII,S$GLB,, | Performed by: INTERNAL MEDICINE

## 2022-04-25 PROCEDURE — 1159F MED LIST DOCD IN RCRD: CPT | Mod: CPTII,S$GLB,, | Performed by: INTERNAL MEDICINE

## 2022-04-25 PROCEDURE — 3044F HG A1C LEVEL LT 7.0%: CPT | Mod: CPTII,S$GLB,, | Performed by: INTERNAL MEDICINE

## 2022-04-25 PROCEDURE — 3008F PR BODY MASS INDEX (BMI) DOCUMENTED: ICD-10-PCS | Mod: CPTII,S$GLB,, | Performed by: INTERNAL MEDICINE

## 2022-04-25 PROCEDURE — 3044F PR MOST RECENT HEMOGLOBIN A1C LEVEL <7.0%: ICD-10-PCS | Mod: CPTII,S$GLB,, | Performed by: INTERNAL MEDICINE

## 2022-04-25 PROCEDURE — 3075F SYST BP GE 130 - 139MM HG: CPT | Mod: CPTII,S$GLB,, | Performed by: INTERNAL MEDICINE

## 2022-04-25 PROCEDURE — 99999 PR PBB SHADOW E&M-EST. PATIENT-LVL III: ICD-10-PCS | Mod: PBBFAC,,, | Performed by: INTERNAL MEDICINE

## 2022-04-25 PROCEDURE — 99999 PR PBB SHADOW E&M-EST. PATIENT-LVL III: CPT | Mod: PBBFAC,,, | Performed by: INTERNAL MEDICINE

## 2022-04-25 PROCEDURE — 99213 PR OFFICE/OUTPT VISIT, EST, LEVL III, 20-29 MIN: ICD-10-PCS | Mod: S$GLB,,, | Performed by: INTERNAL MEDICINE

## 2022-04-25 PROCEDURE — 1160F PR REVIEW ALL MEDS BY PRESCRIBER/CLIN PHARMACIST DOCUMENTED: ICD-10-PCS | Mod: CPTII,S$GLB,, | Performed by: INTERNAL MEDICINE

## 2022-04-25 NOTE — LETTER
April 25, 2022      [facility]  [address]  [facility] [address]  Phone: [phone]  Fax: [phone]       Patient: Ankur Dockery   YOB: 1985  Date of Visit: 04/25/2022    To Whom It May Concern:    Matt Dockery  was at [facility] on 04/25/2022. The patient may return to work/school on 04/26/2022 with no restrictions. If you have any questions or concerns, or if I can be of further assistance, please do not hesitate to contact me.    Sincerely,    Deepti Chand MA

## 2022-04-25 NOTE — TELEPHONE ENCOUNTER
----- Message from Angelika Adams sent at 4/25/2022 12:10 PM CDT -----  Contact: [phone]  Pt was seen this morning and he states he needed an excuse for work can this be emailed or faxed he is asking please advise and give return call he needs this for tomorrow

## 2022-04-25 NOTE — PROGRESS NOTES
"Subjective:       Patient ID: Ankur Dockery is a 36 y.o. male.    Chief Complaint: Follow-up      HPI  Ankur Dockery is a 36 y.o. year old male with history of pancreatitis 2'/2 EtOH; has decreased drinking considerably from last visit. Feels in control of his urges. Has joined a gym. Has been going 2-3 days a week; has picked up an extra job in the evening.     Review of Systems   Constitutional: Negative for activity change, appetite change, chills, fatigue, fever and unexpected weight change.   HENT: Negative for congestion, rhinorrhea and sore throat.    Eyes: Negative for visual disturbance.   Respiratory: Negative for shortness of breath.    Cardiovascular: Negative for chest pain.   Gastrointestinal: Negative for abdominal pain, diarrhea, nausea and vomiting.   Genitourinary: Negative for difficulty urinating and dysuria.   Musculoskeletal: Negative for arthralgias, back pain and myalgias.   Skin: Negative for color change and rash.   Neurological: Negative for dizziness, weakness and headaches.         Past Medical History:   Diagnosis Date    Pancreatitis         Prior to Admission medications    Medication Sig Start Date End Date Taking? Authorizing Provider   ondansetron (ZOFRAN) 4 MG tablet Take 1 tablet (4 mg total) by mouth every 6 (six) hours.  Patient not taking: Reported on 4/25/2022 2/16/22 4/25/22  Ruby Morales NP        Past medical history, surgical history, and family medical history reviewed and updated as appropriate.    Medications and allergies reviewed.     Objective:          Vitals:    04/25/22 0842   BP: (!) 138/94   BP Location: Right arm   Patient Position: Sitting   BP Method: Medium (Manual)   Pulse: 69   SpO2: 99%   Weight: 70.1 kg (154 lb 8.7 oz)   Height: 5' 8" (1.727 m)     Body mass index is 23.5 kg/m².  Physical Exam  Constitutional:       General: He is not in acute distress.     Appearance: He is well-developed.   HENT:      Head: Normocephalic and atraumatic.      " Nose: Nose normal.   Eyes:      General: No scleral icterus.     Extraocular Movements: Extraocular movements intact.   Neck:      Thyroid: No thyromegaly.      Vascular: No JVD.      Trachea: No tracheal deviation.   Cardiovascular:      Rate and Rhythm: Normal rate and regular rhythm.      Heart sounds: Normal heart sounds. No murmur heard.    No friction rub. No gallop.   Pulmonary:      Effort: Pulmonary effort is normal. No respiratory distress.      Breath sounds: Normal breath sounds. No wheezing or rales.   Abdominal:      General: Bowel sounds are normal. There is no distension.      Palpations: Abdomen is soft. There is no mass.      Tenderness: There is no abdominal tenderness.   Musculoskeletal:         General: No tenderness. Normal range of motion.      Cervical back: Normal range of motion and neck supple.   Lymphadenopathy:      Cervical: No cervical adenopathy.   Skin:     General: Skin is warm and dry.      Findings: No rash.   Neurological:      Mental Status: He is alert and oriented to person, place, and time.      Cranial Nerves: No cranial nerve deficit.      Deep Tendon Reflexes: Reflexes normal.   Psychiatric:         Behavior: Behavior normal.         Lab Results   Component Value Date    WBC 5.22 02/28/2022    HGB 14.0 02/28/2022    HCT 43.3 02/28/2022     02/28/2022    CHOL 251 (H) 02/28/2022    TRIG 195 (H) 02/28/2022    HDL 61 02/28/2022    ALT 26 02/28/2022    AST 32 02/28/2022     02/28/2022    K 4.3 02/28/2022    CL 99 02/28/2022    CREATININE 1.0 02/28/2022    BUN 10 02/28/2022    CO2 28 02/28/2022    TSH 0.558 02/28/2022    HGBA1C 5.2 02/28/2022       Assessment:       1. Follow up    2. Uncomplicated alcohol dependence    3. History of acute pancreatitis    4. Elevated blood pressure reading    5. Dyslipidemia          Plan:     Ankur was seen today for follow-up.    Diagnoses and all orders for this visit:    Follow up  Comments:  doing well since follow up; has  made lifestyle changes to decrease alcohol intake and be more active    Uncomplicated alcohol dependence  Comments:  has cut back significantly; knows he has a referrral in place if he needs to speak with addiction psychiatry    History of acute pancreatitis  Comments:  resolved    Elevated blood pressure reading  Comments:  elevated in clinic today; bp logs x 2 weeks, nurse visit in 2 weeks. discussed lifestyle changes.    Dyslipidemia  -     Lipid Panel; Future        Health maintenance reviewed with patient.    Follow up in about 6 months (around 10/25/2022).    Casey Mcintyre MD  Internal Medicine / Primary Care  [facility]  4/25/2022

## 2022-04-25 NOTE — PATIENT INSTRUCTIONS
Purchase a blood pressure cuff.  Check your blood pressures twice a day and write these down on your log.  Nurse visit in 2 weeks for blood pressure check and to drop off log to be reviewed.    Ideal blood pressures <130/<80  (120s/70s).    American heart association recommends less than 2000 mg of salt a day.    American heart association recommends 150 minutes (5 x 30 minutes) of cardiovascular exercise a week to maintain good heart health.     Continue making good lifestyle choice decisions; continue to work on decreasing alcohol intake. Increase activity levels.     Return to clinic in 6 months or sooner if needed.

## 2023-04-11 ENCOUNTER — PATIENT MESSAGE (OUTPATIENT)
Dept: RESEARCH | Facility: HOSPITAL | Age: 38
End: 2023-04-11
Payer: COMMERCIAL